# Patient Record
Sex: FEMALE | Race: WHITE | Employment: FULL TIME | URBAN - METROPOLITAN AREA
[De-identification: names, ages, dates, MRNs, and addresses within clinical notes are randomized per-mention and may not be internally consistent; named-entity substitution may affect disease eponyms.]

---

## 2020-12-15 PROBLEM — K51.019 ULCERATIVE PANCOLITIS WITH COMPLICATION (HCC): Status: ACTIVE | Noted: 2020-12-15

## 2020-12-15 PROBLEM — E10.9 DIABETES TYPE 1, CONTROLLED (HCC): Status: ACTIVE | Noted: 2020-12-15

## 2024-08-13 RX ORDER — ESCITALOPRAM OXALATE 20 MG/1
20 TABLET ORAL DAILY
COMMUNITY

## 2024-08-13 RX ORDER — LISINOPRIL 10 MG/1
10 TABLET ORAL DAILY
COMMUNITY

## 2024-08-13 RX ORDER — GABAPENTIN 100 MG/1
100 CAPSULE ORAL 3 TIMES DAILY
COMMUNITY

## 2024-08-17 ENCOUNTER — LAB ENCOUNTER (OUTPATIENT)
Dept: LAB | Age: 34
End: 2024-08-17
Attending: OBSTETRICS & GYNECOLOGY
Payer: COMMERCIAL

## 2024-08-17 DIAGNOSIS — Z01.818 PRE-OP TESTING: ICD-10-CM

## 2024-08-17 LAB
ANION GAP SERPL CALC-SCNC: 0 MMOL/L (ref 0–18)
BASOPHILS # BLD AUTO: 0.09 X10(3) UL (ref 0–0.2)
BASOPHILS NFR BLD AUTO: 1 %
BUN BLD-MCNC: 13 MG/DL (ref 9–23)
CALCIUM BLD-MCNC: 9.5 MG/DL (ref 8.7–10.4)
CHLORIDE SERPL-SCNC: 107 MMOL/L (ref 98–112)
CO2 SERPL-SCNC: 26 MMOL/L (ref 21–32)
CREAT BLD-MCNC: 1.12 MG/DL
EGFRCR SERPLBLD CKD-EPI 2021: 67 ML/MIN/1.73M2 (ref 60–?)
EOSINOPHIL # BLD AUTO: 0.31 X10(3) UL (ref 0–0.7)
EOSINOPHIL NFR BLD AUTO: 3.4 %
ERYTHROCYTE [DISTWIDTH] IN BLOOD BY AUTOMATED COUNT: 12.7 %
FASTING STATUS PATIENT QL REPORTED: NO
GLUCOSE BLD-MCNC: 356 MG/DL (ref 70–99)
HCT VFR BLD AUTO: 36.1 %
HGB BLD-MCNC: 11.8 G/DL
IMM GRANULOCYTES # BLD AUTO: 0.03 X10(3) UL (ref 0–1)
IMM GRANULOCYTES NFR BLD: 0.3 %
LYMPHOCYTES # BLD AUTO: 2.16 X10(3) UL (ref 1–4)
LYMPHOCYTES NFR BLD AUTO: 23.9 %
MCH RBC QN AUTO: 29.4 PG (ref 26–34)
MCHC RBC AUTO-ENTMCNC: 32.7 G/DL (ref 31–37)
MCV RBC AUTO: 90 FL
MONOCYTES # BLD AUTO: 0.51 X10(3) UL (ref 0.1–1)
MONOCYTES NFR BLD AUTO: 5.6 %
NEUTROPHILS # BLD AUTO: 5.94 X10 (3) UL (ref 1.5–7.7)
NEUTROPHILS # BLD AUTO: 5.94 X10(3) UL (ref 1.5–7.7)
NEUTROPHILS NFR BLD AUTO: 65.8 %
OSMOLALITY SERPL CALC.SUM OF ELEC: 290 MOSM/KG (ref 275–295)
PLATELET # BLD AUTO: 250 10(3)UL (ref 150–450)
POTASSIUM SERPL-SCNC: 5.4 MMOL/L (ref 3.5–5.1)
RBC # BLD AUTO: 4.01 X10(6)UL
SODIUM SERPL-SCNC: 133 MMOL/L (ref 136–145)
WBC # BLD AUTO: 9 X10(3) UL (ref 4–11)

## 2024-08-17 PROCEDURE — 85025 COMPLETE CBC W/AUTO DIFF WBC: CPT

## 2024-08-17 PROCEDURE — 80048 BASIC METABOLIC PNL TOTAL CA: CPT

## 2024-08-17 PROCEDURE — 36415 COLL VENOUS BLD VENIPUNCTURE: CPT

## 2024-08-23 ENCOUNTER — HOSPITAL ENCOUNTER (OUTPATIENT)
Dept: CT IMAGING | Age: 34
Discharge: HOME | End: 2024-08-23
Attending: OBSTETRICS & GYNECOLOGY
Payer: COMMERCIAL

## 2024-08-23 DIAGNOSIS — R10.2 PELVIC PAIN SYNDROME: ICD-10-CM

## 2024-08-23 DIAGNOSIS — N80.03 ENDOMETRIOSIS OF MYOMETRIUM: ICD-10-CM

## 2024-08-23 DIAGNOSIS — N80.00 ENDOMETRIOSIS OF UTERUS: ICD-10-CM

## 2024-08-23 PROCEDURE — 72193 CT PELVIS W/DYE: CPT | Performed by: OBSTETRICS & GYNECOLOGY

## 2024-08-30 NOTE — PAT NURSING NOTE
Reviewing labwork drawn 8/17/24; K elevated. Spoke with Hermann (Lab Charge RN) at 0934; stated should recheck AM of procedure to verify not continued high and talk with patient.    RN called pt at note time; discussed elevated level. Patient states she does not eat bananas but does eat spinach d/t iron deficiency anemia. Also reports A1c elevated and Endocrinologist made adjustments to insulin pump on Monday. RN discussed potassium rich foods to minimize over the weekend and pt could call Endocrinologist to discuss lab level and effect pump change might have on K. Will recheck lab on admit for procedure and may have to reschedule if persistent high or further elevated. Pt verbalized understanding of the conversation.

## 2024-09-03 ENCOUNTER — HOSPITAL ENCOUNTER (OUTPATIENT)
Dept: INTERVENTIONAL RADIOLOGY/VASCULAR | Facility: HOSPITAL | Age: 34
Discharge: HOME | End: 2024-09-03
Attending: OBSTETRICS & GYNECOLOGY | Admitting: OBSTETRICS & GYNECOLOGY
Payer: COMMERCIAL

## 2024-09-03 VITALS
HEART RATE: 100 BPM | RESPIRATION RATE: 16 BRPM | OXYGEN SATURATION: 100 % | SYSTOLIC BLOOD PRESSURE: 99 MMHG | DIASTOLIC BLOOD PRESSURE: 69 MMHG | TEMPERATURE: 98 F

## 2024-09-03 DIAGNOSIS — N94.89 PELVIC CONGESTION: ICD-10-CM

## 2024-09-03 DIAGNOSIS — Z01.818 PRE-OP TESTING: Primary | ICD-10-CM

## 2024-09-03 LAB — INR: 1 (ref 0.8–1.3)

## 2024-09-03 PROCEDURE — 36246 INS CATH ABD/L-EXT ART 2ND: CPT | Performed by: RADIOLOGY

## 2024-09-03 PROCEDURE — 75736 ARTERY X-RAYS PELVIS: CPT | Performed by: RADIOLOGY

## 2024-09-03 PROCEDURE — 36252 INS CATH REN ART 1ST BILAT: CPT | Performed by: RADIOLOGY

## 2024-09-03 PROCEDURE — 85610 PROTHROMBIN TIME: CPT | Performed by: RADIOLOGY

## 2024-09-03 PROCEDURE — B4181ZZ FLUOROSCOPY OF BILATERAL RENAL ARTERIES USING LOW OSMOLAR CONTRAST: ICD-10-PCS | Performed by: RADIOLOGY

## 2024-09-03 PROCEDURE — 36245 INS CATH ABD/L-EXT ART 1ST: CPT | Performed by: RADIOLOGY

## 2024-09-03 RX ORDER — SODIUM CHLORIDE 9 MG/ML
INJECTION, SOLUTION INTRAVENOUS CONTINUOUS
Status: DISCONTINUED | OUTPATIENT
Start: 2024-09-03 | End: 2024-09-03

## 2024-09-03 RX ORDER — LIDOCAINE HYDROCHLORIDE 10 MG/ML
INJECTION, SOLUTION INFILTRATION; PERINEURAL
Status: COMPLETED
Start: 2024-09-03 | End: 2024-09-03

## 2024-09-03 RX ORDER — HEPARIN SODIUM 5000 [USP'U]/ML
INJECTION, SOLUTION INTRAVENOUS; SUBCUTANEOUS
Status: COMPLETED
Start: 2024-09-03 | End: 2024-09-03

## 2024-09-03 RX ORDER — DIPHENHYDRAMINE HYDROCHLORIDE 50 MG/ML
INJECTION INTRAMUSCULAR; INTRAVENOUS
Status: COMPLETED
Start: 2024-09-03 | End: 2024-09-03

## 2024-09-03 RX ORDER — MIDAZOLAM HYDROCHLORIDE 1 MG/ML
INJECTION INTRAMUSCULAR; INTRAVENOUS
Status: COMPLETED
Start: 2024-09-03 | End: 2024-09-03

## 2024-09-03 NOTE — H&P
[facility]   [facility]   History & Physical    Shira Thao Patient Status:  Outpatient    1990 MRN [id number]   Location [facility] INTERVENTIONAL SUITES Attending No att. providers found   Hosp Day # 0 PCP FELECIA ESCAMILLA     Admitting Diagnosis:   Pelvic pain    History of Present Illness:   33 yo F constant pelvic pain for several years.  Worse at end of day.  Family with history of varicose veins.  On feet roughly 4 hrs per day but active with young child.  Worse with periods.  ROS otherwise neg.    History   Past Medical History:  Past Medical History:    Anxiety state    Depression    Kidney infection    Pelvic congestion    Type 1 diabetes mellitus (HCC)       Past Surgical History:  Past Surgical History:   Procedure Laterality Date    Lap, surg; colectomy, total, abdom, w/o proctectomy, w/ileostomy/ileoproctostomy Right 2010    Revision of ileostomy,simple  2016    Tracheostomy, planned  2016    only in for 1 month    Unlisted proc, laparoscopy, surgical, endocrine system         Social History:  Social History     Tobacco Use    Smoking status: Never    Smokeless tobacco: Never   Substance Use Topics    Alcohol use: Never        Family History:  History reviewed. No pertinent family history.    Allergies/Medications:   Allergies:  Allergies   Allergen Reactions    Chlorhexidine ITCHING    Adhesive Tape OTHER (SEE COMMENTS)     itching and blotches from tele pad stickers    Gluten Meal OTHER (SEE COMMENTS)    Latex OTHER (SEE COMMENTS)    Nickel OTHER (SEE COMMENTS)     Skin reaction      Morphine ITCHING       Medications:    Current Outpatient Medications:     adalimumab 80 MG/0.8ML Subcutaneous Pen-injector Kit, Inject 160 mg into the skin once. Every 4 weeks, Disp: , Rfl:     lisinopril 10 MG Oral Tab, Take 1 tablet (10 mg total) by mouth daily. For protein in urning, Disp: , Rfl:     escitalopram 20 MG Oral Tab, Take 1 tablet (20 mg total) by  mouth daily., Disp: , Rfl:     gabapentin 100 MG Oral Cap, Take 1 capsule (100 mg total) by mouth 3 (three) times daily., Disp: , Rfl:     insulin aspart 100 UNIT/ML Subcutaneous Solution, USE 80 TO 90 UNITS D VIA PUMP, Disp: , Rfl:     Cholecalciferol (VITAMIN D-3) 25 MCG (1000 UT) Oral Cap, Take 1,000 Units by mouth., Disp: , Rfl:     Metoclopramide HCl 5 MG Oral Tab, Take 1 tablet (5 mg total) by mouth 3 (three) times daily before meals., Disp: , Rfl:     acetaminophen 500 MG Oral Tab, Take 1 tablet (500 mg total) by mouth every 6 (six) hours as needed for Pain., Disp: , Rfl:     Continuous Blood Gluc Sensor (DEXCOM G6 SENSOR) Does not apply Misc, Take 1 Bottle by mouth As Directed., Disp: , Rfl:     Continuous Blood Gluc Transmit (DEXCOM G6 TRANSMITTER) Does not apply Misc, Take 1 Bottle by mouth As Directed., Disp: , Rfl:     CONTOUR NEXT TEST In Vitro Strip, TEST 6 TIMES A DAY AS DIRECTED, Disp: , Rfl:     diphenhydrAMINE HCl 25 MG Oral Tab, Take 25 mg by mouth every 6 (six) hours as needed for Itching., Disp: , Rfl:     Physical Exam & Review of Systems:   Physical Exam:    BP 99/69   Pulse 100   Temp 97.6 °F (36.4 °C) (Temporal)   Resp 16   LMP 08/04/2024 (Exact Date)   SpO2 100%     General: NAD  Neck: No JVD  Lungs: CTA bilat  Heart: RRR, S1, S2  Abdomen: Soft, NT/ND, BS+x4  Extremities: Warm, dry, no LE edema bilat  Pulses: 2+ bilat DP    Results:   Labs:  No results for input(s): \"RBC\", \"HGB\", \"HCT\", \"MCV\", \"MCH\", \"MCHC\", \"RDW\", \"NEPRELIM\", \"WBC\", \"PLT\" in the last 168 hours.  Recent Labs   Lab 09/03/24  0842   INR 1.0     No results for input(s): \"GLU\", \"BUN\", \"CREATSERUM\", \"GFRAA\", \"GFRNAA\", \"CA\", \"NA\", \"K\", \"CL\", \"CO2\" in the last 168 hours.    Assessment/Plan:   Impression: 35 yo F pelvic pain    I have discussed with the patient and/or legal representative the potential benefits, risks, and side effects of this procedure, the likelihood of the patient achieving goals; and the potential problems  that might occur during recuperation.  I discussed reasonable alternatives to the procedure, including risks, benefits and side effects related to the alternatives, and risks related to not receiving this procedure.      Recommendations: Pelvic venogram    Moises Mondragon MD  9/3/2024  1:00 PM

## 2024-09-03 NOTE — PROGRESS NOTES
Rc'd pt from IR s/p pelvic venogram in stable condition. VSS. Manual dressing to right groin is soft, clean and dry. No bleeding or hematoma. Pt denies c/o pain or discomfort. Family at bedside.     12:00: Dr Mondragon at bedside. Pt tolerated po intake well. Bedrest completed. Pt ambulated and tolerated well. Groin stable. Voided. No IV access. Reviewed discharge instructions, verbalizes understanding. Home via w/c in stable condition without c/o. Pts father is the .

## 2024-09-03 NOTE — PROCEDURES
[facility]   part of [facility]  Procedure Note    Shira Thao Patient Status:  Outpatient    1990 MRN [id number]   Location [facility] INTERVENTIONAL SUITES Attending No att. providers found   Hosp Day # 0 PCP FELECIA ESCAMILLA     Procedure: Pelvic venogram    Pre-Procedure Diagnosis:  Pelvic pain    Post-Procedure Diagnosis: Same    Anesthesia:  Local and Sedation    Findings:  No evidence of venous insufficiency.    Specimens: None    Blood Loss:  Minimal    Tourniquet Time: None  Complications:  None  Drains:  None    Secondary Diagnosis:  None    Moises Mondragon MD  9/3/2024

## 2024-09-03 NOTE — DISCHARGE INSTRUCTIONS
HOME CARE INSTRUCTIONS FOLLOWING VENOUS ACCESS PROCEDURES:     Activity  ~ DO NOT drive after the procedure. You may resume driving tomorrow.   ~ Plan on resting and relaxing tonight and tomorrow.   ~ Do not lift anything over 10 pounds for the next 24hrs.   ~ Avoid sexual activity for the next 24hrs.   ~ Avoid drinking alcohol for the next 24hrs.  ~ Resume your normal activity after 24hrs, or as instructed by your physician.    What is Normal?   ~The procedure site may appear bruised or discolored.   ~ The procedure site may be tender to the touch.  ~ There may be a small amount of drainage on the bandage.     Special Instructions  ~The bandage is to remain in place for 24hrs.  ~After 24hrs, you MUST remove the bandage. You should shower after removing the bandage, and wash the procedure site gently with soap and water. Leave the site open to air, you do not need to cover it. No tub baths/pools for 3 days.     When you should NOTIFY YOUR PHYSICIAN  ~ If you have shortness of breath or a persistent cough  ~ If you have chest pains (angina)   ~ If you have palpitations or irregular heart beats  ~ If you have persistent pain at the procedure site  ~ If you experience signs of a fever, temperature greater than 101 degrees, chills, infection (redness, swelling, thick yellow drainage, or a foul odor from the procedure site)     Other  ~ You arielle resume your present diet, unless otherwise specified by your physician  ~ You may resume all of your medications as prescribed, unless otherwise directed by your physician. A list of your medications was provided at discharge  ~Please call your physician's office for a follow up appointment. You shold be seen in 1-2 weeks

## 2025-01-14 ENCOUNTER — HOSPITAL ENCOUNTER (OUTPATIENT)
Dept: CT IMAGING | Facility: HOSPITAL | Age: 35
Discharge: HOME | End: 2025-01-14
Attending: OBSTETRICS & GYNECOLOGY
Payer: COMMERCIAL

## 2025-01-14 ENCOUNTER — IMAGING SERVICES (OUTPATIENT)
Dept: OTHER | Age: 35
End: 2025-01-14

## 2025-01-14 DIAGNOSIS — I87.1 MAY-THURNER SYNDROME: ICD-10-CM

## 2025-01-14 LAB
CREAT BLD-MCNC: 0.8 MG/DL
EGFRCR SERPLBLD CKD-EPI 2021: 100 ML/MIN/1.73M2 (ref 60–?)

## 2025-01-14 PROCEDURE — 74177 CT ABD & PELVIS W/CONTRAST: CPT | Performed by: OBSTETRICS & GYNECOLOGY

## 2025-01-14 PROCEDURE — 82565 ASSAY OF CREATININE: CPT

## 2025-03-31 PROBLEM — Z93.2 ILEOSTOMY, HAS CURRENTLY (HCC): Status: ACTIVE | Noted: 2025-03-31

## 2025-03-31 PROBLEM — E11.43 DM GASTROPARESIS (HCC): Status: ACTIVE | Noted: 2025-03-31

## 2025-03-31 PROBLEM — K31.84 DM GASTROPARESIS (HCC): Status: ACTIVE | Noted: 2025-03-31

## 2025-03-31 PROBLEM — F41.9 ANXIETY DISORDER: Status: ACTIVE | Noted: 2025-03-31

## 2025-03-31 RX ORDER — PANTOPRAZOLE SODIUM 40 MG/1
40 TABLET, DELAYED RELEASE ORAL 2 TIMES DAILY
COMMUNITY

## 2025-03-31 RX ORDER — ONDANSETRON 4 MG/1
4 TABLET, ORALLY DISINTEGRATING ORAL DAILY
COMMUNITY

## 2025-04-05 ENCOUNTER — EKG ENCOUNTER (OUTPATIENT)
Dept: LAB | Age: 35
End: 2025-04-05
Attending: OBSTETRICS & GYNECOLOGY
Payer: COMMERCIAL

## 2025-04-05 DIAGNOSIS — K51.019 ULCERATIVE PANCOLITIS WITH COMPLICATION (HCC): ICD-10-CM

## 2025-04-05 DIAGNOSIS — K31.84 DM GASTROPARESIS (HCC): ICD-10-CM

## 2025-04-05 DIAGNOSIS — E11.43 DM GASTROPARESIS (HCC): ICD-10-CM

## 2025-04-05 LAB
ATRIAL RATE: 93 BPM
P AXIS: 72 DEGREES
P-R INTERVAL: 110 MS
Q-T INTERVAL: 346 MS
QRS DURATION: 62 MS
QTC CALCULATION (BEZET): 430 MS
R AXIS: 67 DEGREES
T AXIS: 63 DEGREES
VENTRICULAR RATE: 93 BPM

## 2025-04-05 PROCEDURE — 93005 ELECTROCARDIOGRAM TRACING: CPT

## 2025-04-05 PROCEDURE — 93010 ELECTROCARDIOGRAM REPORT: CPT | Performed by: INTERNAL MEDICINE

## 2025-04-14 NOTE — H&P
[facility]   part of [facility]    History and Physical    Shira Thao Patient Status:  Hospital Outpatient Surgery    1990 MRN [id number]   Location [facility] SURGERY Attending Ken Salinas, Paul CRAMER MD   Hosp Day # 0 PCP FELECIA ESCAMILLA     Date:  2025  Date of Admission:  (Not on file)    History provided by:patient  HPI:   No chief complaint on file.    HPI pelvic adhesions, endometriosis    History   Past Medical History[1]  Past Surgical History[2]  Family History[3]  Social History:  Short Social Hx on File[4]  Allergies/Medications:   Allergies: Allergies[5]  Prescriptions Prior to Admission[6]    Review of Systems:     Constitutional: Negative.        Physical Exam:   Vital Signs:  Height 5' (1.524 m), weight 103 lb (46.7 kg), last menstrual period 2025, not currently breastfeeding.  Physical Exam  Constitutional:       Appearance: Normal appearance.   Cardiovascular:      Rate and Rhythm: Normal rate and regular rhythm.      Pulses: Normal pulses.      Heart sounds: Normal heart sounds.   Pulmonary:      Effort: Pulmonary effort is normal.      Breath sounds: Normal breath sounds.   Abdominal:      Palpations: Abdomen is soft.   Musculoskeletal:         General: Normal range of motion.   Skin:     General: Skin is warm and dry.   Neurological:      Mental Status: She is alert and oriented to person, place, and time.   Psychiatric:         Behavior: Behavior normal.       Cervical Papanicolaou done within 1 year of adm    Results:     Lab Results   Component Value Date    WBC 9.0 2024    HGB 11.8 (L) 2024    HCT 36.1 2024    .0 2024    CREATSERUM 1.12 (H) 2024    BUN 13 2024     (L) 2024    K 5.4 (H) 2024     2024    CO2 26.0 2024     (H) 2024    CA 9.5 2024    INR 1.0 2024     No results found.        Assessment/Plan:     LAPAROSCOPY, EXCISION OF  ENDOMETRIOSIS, LYSIS OF ADHESIONS, HYSTEROSCOPY, TUBAL LAVAGE, CYSTOSCOPY             Md Paul Ireland MD  4/14/2025         [1]   Past Medical History:   Anxiety state    Crohn disease (HCC)    Depression    Esophageal reflux    History of blood transfusion    2012    Kidney infection    Migraines    Pelvic congestion    PONV (postoperative nausea and vomiting)    Type 1 diabetes mellitus (HCC)    Visual impairment    glasses, corneal ulcers, patch on right eye   [2]   Past Surgical History:  Procedure Laterality Date    Lap, surg; colectomy, total, abdom, w/o proctectomy, w/ileostomy/ileoproctostomy Right 05/2010    Revision of ileostomy,simple  11/2016    Tracheostomy, planned  11/2016    only in for 1 month    Unlisted proc, laparoscopy, surgical, endocrine system  2013   [3] History reviewed. No pertinent family history.  [4]   Social History  Socioeconomic History    Marital status: Single   Tobacco Use    Smoking status: Some Days     Current packs/day: 0.50     Types: Cigarettes    Smokeless tobacco: Never   Vaping Use    Vaping status: Never Used   Substance and Sexual Activity    Alcohol use: Never    Drug use: Never   [5]   Allergies  Allergen Reactions    Adhesive Tape OTHER (SEE COMMENTS)     itching and blotches from tele pad stickers    Gluten Meal OTHER (SEE COMMENTS)    Latex OTHER (SEE COMMENTS)     rash    Nickel OTHER (SEE COMMENTS)     Skin reaction      Morphine ITCHING   [6]   No medications prior to admission.

## 2025-04-16 ENCOUNTER — ANESTHESIA EVENT (OUTPATIENT)
Dept: SURGERY | Facility: HOSPITAL | Age: 35
End: 2025-04-16
Payer: COMMERCIAL

## 2025-04-16 ENCOUNTER — HOSPITAL ENCOUNTER (OUTPATIENT)
Facility: HOSPITAL | Age: 35
Setting detail: HOSPITAL OUTPATIENT SURGERY
Discharge: HOME | End: 2025-04-16
Attending: OBSTETRICS & GYNECOLOGY | Admitting: OBSTETRICS & GYNECOLOGY
Payer: COMMERCIAL

## 2025-04-16 ENCOUNTER — ANESTHESIA (OUTPATIENT)
Dept: SURGERY | Facility: HOSPITAL | Age: 35
End: 2025-04-16
Payer: COMMERCIAL

## 2025-04-16 VITALS
HEIGHT: 60 IN | SYSTOLIC BLOOD PRESSURE: 100 MMHG | HEART RATE: 93 BPM | BODY MASS INDEX: 20.22 KG/M2 | WEIGHT: 103 LBS | DIASTOLIC BLOOD PRESSURE: 59 MMHG | OXYGEN SATURATION: 100 % | RESPIRATION RATE: 17 BRPM | TEMPERATURE: 98 F

## 2025-04-16 DIAGNOSIS — G89.18 POST-OPERATIVE PAIN: ICD-10-CM

## 2025-04-16 DIAGNOSIS — E11.43 DM GASTROPARESIS (HCC): ICD-10-CM

## 2025-04-16 DIAGNOSIS — K51.019 ULCERATIVE PANCOLITIS WITH COMPLICATION (HCC): Primary | ICD-10-CM

## 2025-04-16 DIAGNOSIS — K31.84 DM GASTROPARESIS (HCC): ICD-10-CM

## 2025-04-16 LAB
B-HCG UR QL: NEGATIVE
GLUCOSE BLD-MCNC: 126 MG/DL (ref 70–99)
GLUCOSE BLD-MCNC: 138 MG/DL (ref 70–99)

## 2025-04-16 PROCEDURE — 82962 GLUCOSE BLOOD TEST: CPT

## 2025-04-16 PROCEDURE — 88305 TISSUE EXAM BY PATHOLOGIST: CPT | Performed by: OBSTETRICS & GYNECOLOGY

## 2025-04-16 PROCEDURE — 81025 URINE PREGNANCY TEST: CPT

## 2025-04-16 DEVICE — ABSORBABLE ADHESION BARRIER
Type: IMPLANTABLE DEVICE | Site: ABDOMEN | Status: FUNCTIONAL
Brand: GYNECARE INTERCEED

## 2025-04-16 RX ORDER — MEPERIDINE HYDROCHLORIDE 25 MG/ML
INJECTION INTRAMUSCULAR; INTRAVENOUS; SUBCUTANEOUS
Status: COMPLETED
Start: 2025-04-16 | End: 2025-04-16

## 2025-04-16 RX ORDER — LIDOCAINE HYDROCHLORIDE 10 MG/ML
INJECTION, SOLUTION EPIDURAL; INFILTRATION; INTRACAUDAL; PERINEURAL AS NEEDED
Status: DISCONTINUED | OUTPATIENT
Start: 2025-04-16 | End: 2025-04-16 | Stop reason: SURG

## 2025-04-16 RX ORDER — ACETAMINOPHEN 500 MG
1000 TABLET ORAL ONCE
Status: DISCONTINUED | OUTPATIENT
Start: 2025-04-16 | End: 2025-04-16 | Stop reason: HOSPADM

## 2025-04-16 RX ORDER — NEOSTIGMINE METHYLSULFATE 1 MG/ML
INJECTION INTRAVENOUS AS NEEDED
Status: DISCONTINUED | OUTPATIENT
Start: 2025-04-16 | End: 2025-04-16 | Stop reason: SURG

## 2025-04-16 RX ORDER — MEPERIDINE HYDROCHLORIDE 25 MG/ML
12.5 INJECTION INTRAMUSCULAR; INTRAVENOUS; SUBCUTANEOUS AS NEEDED
Status: DISCONTINUED | OUTPATIENT
Start: 2025-04-16 | End: 2025-04-16

## 2025-04-16 RX ORDER — DEXAMETHASONE SODIUM PHOSPHATE 4 MG/ML
VIAL (ML) INJECTION AS NEEDED
Status: DISCONTINUED | OUTPATIENT
Start: 2025-04-16 | End: 2025-04-16 | Stop reason: SURG

## 2025-04-16 RX ORDER — HYDROMORPHONE HYDROCHLORIDE 1 MG/ML
0.4 INJECTION, SOLUTION INTRAMUSCULAR; INTRAVENOUS; SUBCUTANEOUS EVERY 5 MIN PRN
Status: DISCONTINUED | OUTPATIENT
Start: 2025-04-16 | End: 2025-04-16

## 2025-04-16 RX ORDER — ONDANSETRON 2 MG/ML
4 INJECTION INTRAMUSCULAR; INTRAVENOUS EVERY 6 HOURS PRN
Status: DISCONTINUED | OUTPATIENT
Start: 2025-04-16 | End: 2025-04-16

## 2025-04-16 RX ORDER — HYDROMORPHONE HYDROCHLORIDE 1 MG/ML
INJECTION, SOLUTION INTRAMUSCULAR; INTRAVENOUS; SUBCUTANEOUS
Status: COMPLETED
Start: 2025-04-16 | End: 2025-04-16

## 2025-04-16 RX ORDER — GLYCOPYRROLATE 0.2 MG/ML
INJECTION, SOLUTION INTRAMUSCULAR; INTRAVENOUS AS NEEDED
Status: DISCONTINUED | OUTPATIENT
Start: 2025-04-16 | End: 2025-04-16 | Stop reason: SURG

## 2025-04-16 RX ORDER — INSULIN ASPART 100 [IU]/ML
INJECTION, SOLUTION INTRAVENOUS; SUBCUTANEOUS ONCE
Status: DISCONTINUED | OUTPATIENT
Start: 2025-04-16 | End: 2025-04-16

## 2025-04-16 RX ORDER — NALOXONE HYDROCHLORIDE 0.4 MG/ML
0.08 INJECTION, SOLUTION INTRAMUSCULAR; INTRAVENOUS; SUBCUTANEOUS AS NEEDED
Status: DISCONTINUED | OUTPATIENT
Start: 2025-04-16 | End: 2025-04-16

## 2025-04-16 RX ORDER — HYDROCODONE BITARTRATE AND ACETAMINOPHEN 5; 325 MG/1; MG/1
1 TABLET ORAL EVERY 4 HOURS PRN
Refills: 0 | Status: DISCONTINUED | OUTPATIENT
Start: 2025-04-16 | End: 2025-04-16

## 2025-04-16 RX ORDER — HYDROCODONE BITARTRATE AND ACETAMINOPHEN 5; 325 MG/1; MG/1
2 TABLET ORAL EVERY 4 HOURS PRN
Refills: 0 | Status: DISCONTINUED | OUTPATIENT
Start: 2025-04-16 | End: 2025-04-16

## 2025-04-16 RX ORDER — PROCHLORPERAZINE EDISYLATE 5 MG/ML
5 INJECTION INTRAMUSCULAR; INTRAVENOUS EVERY 8 HOURS PRN
Status: DISCONTINUED | OUTPATIENT
Start: 2025-04-16 | End: 2025-04-16

## 2025-04-16 RX ORDER — MIDAZOLAM HYDROCHLORIDE 1 MG/ML
INJECTION INTRAMUSCULAR; INTRAVENOUS
Status: DISCONTINUED
Start: 2025-04-16 | End: 2025-04-16 | Stop reason: WASHOUT

## 2025-04-16 RX ORDER — HYDROCODONE BITARTRATE AND ACETAMINOPHEN 5; 325 MG/1; MG/1
1-2 TABLET ORAL EVERY 4 HOURS PRN
Qty: 20 TABLET | Refills: 0 | Status: SHIPPED | OUTPATIENT
Start: 2025-04-16

## 2025-04-16 RX ORDER — PHENYLEPHRINE HCL 10 MG/ML
VIAL (ML) INJECTION AS NEEDED
Status: DISCONTINUED | OUTPATIENT
Start: 2025-04-16 | End: 2025-04-16 | Stop reason: SURG

## 2025-04-16 RX ORDER — ROCURONIUM BROMIDE 10 MG/ML
INJECTION, SOLUTION INTRAVENOUS AS NEEDED
Status: DISCONTINUED | OUTPATIENT
Start: 2025-04-16 | End: 2025-04-16 | Stop reason: SURG

## 2025-04-16 RX ORDER — PHENAZOPYRIDINE HYDROCHLORIDE 200 MG/1
200 TABLET, FILM COATED ORAL ONCE
Status: COMPLETED | OUTPATIENT
Start: 2025-04-16 | End: 2025-04-16

## 2025-04-16 RX ORDER — NICOTINE POLACRILEX 4 MG
15 LOZENGE BUCCAL
Status: DISCONTINUED | OUTPATIENT
Start: 2025-04-16 | End: 2025-04-16 | Stop reason: HOSPADM

## 2025-04-16 RX ORDER — SODIUM CHLORIDE, SODIUM LACTATE, POTASSIUM CHLORIDE, CALCIUM CHLORIDE 600; 310; 30; 20 MG/100ML; MG/100ML; MG/100ML; MG/100ML
INJECTION, SOLUTION INTRAVENOUS CONTINUOUS
Status: DISCONTINUED | OUTPATIENT
Start: 2025-04-16 | End: 2025-04-16

## 2025-04-16 RX ORDER — MIDAZOLAM HYDROCHLORIDE 1 MG/ML
1 INJECTION INTRAMUSCULAR; INTRAVENOUS EVERY 5 MIN PRN
Status: DISCONTINUED | OUTPATIENT
Start: 2025-04-16 | End: 2025-04-16

## 2025-04-16 RX ORDER — HYDROMORPHONE HYDROCHLORIDE 1 MG/ML
0.6 INJECTION, SOLUTION INTRAMUSCULAR; INTRAVENOUS; SUBCUTANEOUS EVERY 5 MIN PRN
Status: DISCONTINUED | OUTPATIENT
Start: 2025-04-16 | End: 2025-04-16

## 2025-04-16 RX ORDER — BUPIVACAINE HYDROCHLORIDE 5 MG/ML
INJECTION, SOLUTION EPIDURAL; INTRACAUDAL; PERINEURAL AS NEEDED
Status: DISCONTINUED | OUTPATIENT
Start: 2025-04-16 | End: 2025-04-16 | Stop reason: HOSPADM

## 2025-04-16 RX ORDER — PROCHLORPERAZINE EDISYLATE 5 MG/ML
INJECTION INTRAMUSCULAR; INTRAVENOUS
Status: COMPLETED
Start: 2025-04-16 | End: 2025-04-16

## 2025-04-16 RX ORDER — NICOTINE POLACRILEX 4 MG
30 LOZENGE BUCCAL
Status: DISCONTINUED | OUTPATIENT
Start: 2025-04-16 | End: 2025-04-16 | Stop reason: HOSPADM

## 2025-04-16 RX ORDER — ONDANSETRON 8 MG/1
8 TABLET, ORALLY DISINTEGRATING ORAL EVERY 4 HOURS PRN
Qty: 10 TABLET | Refills: 0 | Status: SHIPPED | OUTPATIENT
Start: 2025-04-16

## 2025-04-16 RX ORDER — ONDANSETRON 2 MG/ML
INJECTION INTRAMUSCULAR; INTRAVENOUS AS NEEDED
Status: DISCONTINUED | OUTPATIENT
Start: 2025-04-16 | End: 2025-04-16 | Stop reason: SURG

## 2025-04-16 RX ORDER — HYDROMORPHONE HYDROCHLORIDE 1 MG/ML
0.2 INJECTION, SOLUTION INTRAMUSCULAR; INTRAVENOUS; SUBCUTANEOUS EVERY 5 MIN PRN
Status: DISCONTINUED | OUTPATIENT
Start: 2025-04-16 | End: 2025-04-16

## 2025-04-16 RX ORDER — DEXTROSE MONOHYDRATE 25 G/50ML
50 INJECTION, SOLUTION INTRAVENOUS
Status: DISCONTINUED | OUTPATIENT
Start: 2025-04-16 | End: 2025-04-16 | Stop reason: HOSPADM

## 2025-04-16 RX ADMIN — PHENYLEPHRINE HCL 100 MCG: 10 MG/ML VIAL (ML) INJECTION at 09:25:00

## 2025-04-16 RX ADMIN — ONDANSETRON 4 MG: 2 INJECTION INTRAMUSCULAR; INTRAVENOUS at 10:21:00

## 2025-04-16 RX ADMIN — SODIUM CHLORIDE, SODIUM LACTATE, POTASSIUM CHLORIDE, CALCIUM CHLORIDE: 600; 310; 30; 20 INJECTION, SOLUTION INTRAVENOUS at 10:48:00

## 2025-04-16 RX ADMIN — PHENYLEPHRINE HCL 100 MCG: 10 MG/ML VIAL (ML) INJECTION at 09:45:00

## 2025-04-16 RX ADMIN — PHENYLEPHRINE HCL 100 MCG: 10 MG/ML VIAL (ML) INJECTION at 09:28:00

## 2025-04-16 RX ADMIN — GLYCOPYRROLATE 0.6 MG: 0.2 INJECTION, SOLUTION INTRAMUSCULAR; INTRAVENOUS at 10:33:00

## 2025-04-16 RX ADMIN — ROCURONIUM BROMIDE 40 MG: 10 INJECTION, SOLUTION INTRAVENOUS at 09:08:00

## 2025-04-16 RX ADMIN — DEXAMETHASONE SODIUM PHOSPHATE 8 MG: 4 MG/ML VIAL (ML) INJECTION at 09:32:00

## 2025-04-16 RX ADMIN — ROCURONIUM BROMIDE 10 MG: 10 INJECTION, SOLUTION INTRAVENOUS at 10:07:00

## 2025-04-16 RX ADMIN — NEOSTIGMINE METHYLSULFATE 3 MG: 1 INJECTION INTRAVENOUS at 10:33:00

## 2025-04-16 RX ADMIN — LIDOCAINE HYDROCHLORIDE 40 MG: 10 INJECTION, SOLUTION EPIDURAL; INFILTRATION; INTRACAUDAL; PERINEURAL at 09:06:00

## 2025-04-16 NOTE — ANESTHESIA POSTPROCEDURE EVALUATION
[facility]    Shira KERR [facility] Patient Status:  Hospital Outpatient Surgery   Age/Gender 35 year old female MRN [id number]   Location [facility] SURGERY Attending Ken Salinas, Paul CRAMER MD   Hosp Day # 0 PCP FELECIA ESCAMILLA       Anesthesia Post-op Note    LAPAROSCOPY, EXCISION OF ENDOMETRIOSIS, LYSIS OF ADHESIONS, HYSTEROSCOPY, TUBAL LAVAGE, CYSTOSCOPY, URTERINE UPLIFT    Procedure Summary       Date: 04/16/25 Room / Location:  MAIN OR 14 / [facility] MAIN OR    Anesthesia Start: 0903 Anesthesia Stop: 1048    Procedure: LAPAROSCOPY, EXCISION OF ENDOMETRIOSIS, LYSIS OF ADHESIONS, HYSTEROSCOPY, TUBAL LAVAGE, CYSTOSCOPY, URTERINE UPLIFT (Abdomen) Diagnosis: (PELVIC PAIN)    Surgeons: Ken Salinas, Paul CRAMER MD Anesthesiologist: Nic Armenta MD    Anesthesia Type: Not recorded ASA Status: 3            Anesthesia Type: No value filed.    Vitals Value Taken Time   /68 04/16/25 10:49   Temp 99.8 °F (37.7 °C) 04/16/25 10:49   Pulse 91 04/16/25 10:50   Resp 9 04/16/25 10:50   SpO2 91 % 04/16/25 10:50   Vitals shown include unfiled device data.        Patient Location: PACU    Anesthesia Type: general    Airway Patency: patent    Postop Pain Control: adequate    Mental Status: mildly sedated but able to meaningfully participate in the post-anesthesia evaluation    Nausea/Vomiting: none    Cardiopulmonary/Hydration status: stable euvolemic    Complications: no apparent anesthesia related complications    Postop vital signs: stable    Dental Exam: Unchanged from Preop    Patient to be discharged from PACU when criteria met.

## 2025-04-16 NOTE — ANESTHESIA PROCEDURE NOTES
Airway  Date/Time: 4/16/2025 9:10 AM  Reason: elective    Airway not difficult    General Information and Staff   Patient location during procedure: OR  Anesthesiologist: Nic Armenta MD  Performed: anesthesiologist   Performed by: Nic Armenta MD  Authorized by: Nic Armenta MD        Indications and Patient Condition  Indications for airway management: anesthesia  Sedation level: deep      Preoxygenated: yesPatient position: sniffing    Mask difficulty assessment: 1 - vent by mask  Planned trial extubation    Final Airway Details    Final airway type: endotracheal airway    Successful airway: ETT  Cuffed: yes   Successful intubation technique: direct laryngoscopy  Endotracheal tube insertion site: oral  Blade: Elissa  Blade size: #3  ETT size (mm): 7.0    Cormack-Lehane Classification: grade IIA - partial view of glottis  Placement verified by: capnometry   Measured from: lips  ETT to lips (cm): 19  Number of attempts at approach: 1

## 2025-04-16 NOTE — BRIEF OP NOTE
Pre-Operative Diagnosis: PELVIC PAIN     Post-Operative Diagnosis: PELVIC PAIN      Procedure Performed:   LAPAROSCOPY, EXCISION OF ENDOMETRIOSIS, LYSIS OF ADHESIONS, HYSTEROSCOPY, TUBAL LAVAGE, CYSTOSCOPY, URTERINE UPLIFT    Surgeons and Role:     * Ken Salinas, Paul CRAMER MD - Primary     * Ira Ferreira MD - Assisting Surgeon    Assistant(s):  RNFA: Angelica Solares RN     Surgical Findings: adhesions endometriosis,      Specimen: endometriosis     Estimated Blood Loss:10cc        Md Paul Ireland MD  4/16/2025  10:10 AM

## 2025-04-16 NOTE — DISCHARGE INSTRUCTIONS
Home Care Instructions  LAPAROSCOPIC SURGERY  Dr CALEB Rogers    1. During the next twenty-four hours, you should neither drive nor operate  complicated machines.  2. During the next few days, you may experience the following   a. Tired, washed out feeling   b. Vaginal bleeding or spotting with cramps, due to the instruments placed in the opening of the womb or the                   cervix, for up to two weeks    c. Mild to moderate shoulder, throat, neck or chest pain or abdominal bloating, due to gas placed in your abdomen during the      procedure (this is temporary and is usually relieved by lying with your back elevated about 30 degrees.   d. Some soreness or slight dark red oozing around the incision is normal   e. Abdominal bruises and tenderness  3. Contact the doctor’s office if   a. You develop a fever of 100.4 degrees or over   b. You have severe pain or persistent vomiting   c. You have a yellowish discharge from the incision or extreme tenderness or   soreness in the area   d. You have extreme vaginal bleeding (saturating greater than one pad per   hour  4. Activity   a. Take you temperature daily for three days   b. Clear liquid diet today and advance to a soft tomorrow as tolerated   c. Leave Tegaderm and steri-strips in place for one week. Sutures dissolve on their own in two to three weeks. If a piece of                 suture is noted it will be removed at the post operative visit.    d. No intercourse for one week   e. No lifting over 25 pounds for three weeks   f. No douching for one week. No tampons for one week   g. No tub bath for three to four days   h. Call office tomorrow to make an appointment for post-operative follow-up in 4-6 weeks   i. You may shower in the morning   j. Take your pain medications as directed      Tovar/Catheter Removal   If you had a Tovar Catheter placed prior to your hospital discharge, remove Tovar Catheter in the early morning, the day after surgery (Post-Op Day 1). If  you are unable to void within 6 hours of removing catheter, contact office immediately.    BEATRICE Trivedi  [phone]

## 2025-04-16 NOTE — INTERVAL H&P NOTE
Pre-op Diagnosis: PELVIC PAIN    The above referenced H&P was reviewed by Md Paul Ireland MD on 4/16/2025, the patient was examined and no significant changes have occurred in the patient's condition since the H&P was performed.  I discussed with the patient and/or legal representative the potential benefits, risks and side effects of this procedure; the likelihood of the patient achieving goals; and potential problems that might occur during recuperation.  I discussed reasonable alternatives to the procedure, including risks, benefits and side effects related to the alternatives and risks related to not receiving this procedure.  We will proceed with procedure as planned.

## 2025-04-16 NOTE — ANESTHESIA PREPROCEDURE EVALUATION
PRE-OP EVALUATION    Patient Name: Shira Thao    Admit Diagnosis: PELVIC PAIN    Pre-op Diagnosis: PELVIC PAIN    LAPAROSCOPY, EXCISION OF ENDOMETRIOSIS, LYSIS OF ADHESIONS, HYSTEROSCOPY, TUBAL LAVAGE, CYSTOSCOPY    Anesthesia Procedure: LAPAROSCOPY, EXCISION OF ENDOMETRIOSIS, LYSIS OF ADHESIONS, HYSTEROSCOPY, TUBAL LAVAGE, CYSTOSCOPY    Surgeons and Role:     * Ken Salinas, Paul CRAMER MD - Primary     * Ira Ferreira MD - Assisting Surgeon    Pre-op vitals reviewed.  Temp: 97.9 °F (36.6 °C)  Pulse: 92  Resp: 16  BP: 116/65  SpO2: 100 %  Body mass index is 20.12 kg/m².    Current medications reviewed.  Hospital Medications:  Current Medications[1]    Outpatient Medications:   Prescriptions Prior to Admission[2]    Allergies: Adhesive tape, Gluten meal, Latex, Nickel, and Morphine      Anesthesia Evaluation    Patient summary reviewed.    Anesthetic Complications  (+) history of anesthetic complications  History of: PONV       GI/Hepatic/Renal      (+) GERD                  (+) Crohn's disease         Cardiovascular                                                       Endo/Other  Comment: Corneal abrasions right eye. Vision affected by ulcers.    (+) diabetes                            Pulmonary                           Neuro/Psych      (+) depression                                Past Surgical History[3]  Social Hx on file[4]  History   Drug Use Unknown     Available pre-op labs reviewed.               Airway      Mallampati: I  Mouth opening: >3 FB  TM distance: > 6 cm  Neck ROM: full Cardiovascular    Cardiovascular exam normal.  Rhythm: regular  Rate: normal     Dental             Pulmonary    Pulmonary exam normal.                 Other findings              ASA: 3     NPO status verified and patient meets guidelines.          Plan/risks discussed with: patient and significant other                Present on Admission:  **None**             [1]    acetaminophen (Tylenol Extra Strength) tab 1,000 mg   1,000 mg Oral Once    glucose (Dex4) 15 GM/59ML oral liquid 15 g  15 g Oral Q15 Min PRN    Or    glucose (Glutose) 40% oral gel 15 g  15 g Oral Q15 Min PRN    Or    glucose-vitamin C (Dex-4) chewable tab 4 tablet  4 tablet Oral Q15 Min PRN    Or    dextrose 50% injection 50 mL  50 mL Intravenous Q15 Min PRN    Or    glucose (Dex4) 15 GM/59ML oral liquid 30 g  30 g Oral Q15 Min PRN    Or    glucose (Glutose) 40% oral gel 30 g  30 g Oral Q15 Min PRN    Or    glucose-vitamin C (Dex-4) chewable tab 8 tablet  8 tablet Oral Q15 Min PRN    lactated ringers infusion   Intravenous Continuous    [COMPLETED] phenazopyridine (Pyridium) tab 200 mg  200 mg Oral Once    ceFAZolin (Ancef) 2g in 10mL IV syringe premix  2 g Intravenous Once   [2]   Medications Prior to Admission   Medication Sig Dispense Refill Last Dose/Taking    ondansetron 4 MG Oral Tablet Dispersible Take 1 tablet (4 mg total) by mouth in the morning.   4/16/2025 at  4:30 AM    pantoprazole 40 MG Oral Tab EC Take 1 tablet (40 mg total) by mouth in the morning and 1 tablet (40 mg total) before bedtime.   4/16/2025 at  4:30 AM    lisinopril 10 MG Oral Tab Take 1 tablet (10 mg total) by mouth in the morning. For protein in urine.   4/15/2025 Evening    escitalopram 20 MG Oral Tab Take 1 tablet (20 mg total) by mouth in the morning.   4/15/2025    gabapentin 100 MG Oral Cap Take 1 capsule (100 mg total) by mouth in the morning and 1 capsule (100 mg total) in the evening and 1 capsule (100 mg total) before bedtime.   4/15/2025    insulin aspart 100 UNIT/ML Subcutaneous Solution USE 80 TO 90 UNITS D VIA PUMP   Taking    Metoclopramide HCl 5 MG Oral Tab Take 1 tablet (5 mg total) by mouth in the morning and 1 tablet (5 mg total) at noon and 1 tablet (5 mg total) in the evening. Take before meals.   4/15/2025    acetaminophen 500 MG Oral Tab Take 1 tablet (500 mg total) by mouth every 6 (six) hours as needed for Pain.   4/16/2025 at  4:30 AM    diphenhydrAMINE HCl 25 MG  Oral Tab Take 1 tablet (25 mg total) by mouth every 6 (six) hours as needed for Itching.   Taking As Needed    Continuous Blood Gluc Sensor (DEXCOM G6 SENSOR) Does not apply Misc Take 1 Bottle by mouth As Directed.       Continuous Blood Gluc Transmit (DEXCOM G6 TRANSMITTER) Does not apply Misc Take 1 Bottle by mouth As Directed.       CONTOUR NEXT TEST In Vitro Strip TEST 6 TIMES A DAY AS DIRECTED       Cholecalciferol (VITAMIN D-3) 25 MCG (1000 UT) Oral Cap Take 1,000 Units by mouth.   More than a month   [3]   Past Surgical History:  Procedure Laterality Date    Lap, surg; colectomy, total, abdom, w/o proctectomy, w/ileostomy/ileoproctostomy Right 05/2010    Revision of ileostomy,simple  11/2016    Tracheostomy, planned  11/2016    only in for 1 month    Unlisted proc, laparoscopy, surgical, endocrine system  2013   [4]   Social History  Socioeconomic History    Marital status: Single   Tobacco Use    Smoking status: Some Days     Current packs/day: 0.50     Types: Cigarettes    Smokeless tobacco: Never   Vaping Use    Vaping status: Never Used   Substance and Sexual Activity    Alcohol use: Never    Drug use: Never

## 2025-04-17 NOTE — OPERATIVE REPORT
[facility]    PATIENT'S NAME: CLAIR BAIG   ATTENDING PHYSICIAN: Paul Rogers M.D.   OPERATING PHYSICIAN: Paul Rogers M.D.   PATIENT ACCOUNT#:   [id number]    LOCATION:  [facility] 14 ED 10  MEDICAL RECORD #:   [id number]       YOB: 1990  ADMISSION DATE:       04/16/2025      OPERATION DATE:  04/16/2025    OPERATIVE REPORT    PREOPERATIVE DIAGNOSIS:    1.   Severe pelvic pain, status post total colectomy, status post proctectomy, status post small bowel obstruction with permanent ileostomy.  2.   Pelvic adhesions.  3.   Left ovarian cyst.  POSTOPERATIVE DIAGNOSIS:    1.   Status post total colectomy, proctectomy, small bowel obstruction with permanent ileostomy.  2.   Pelvic pain.  3.   Pelvic adhesions.  4.   Patent fallopian tubes bilaterally.    5.   Nonvisualization of the left ovary.    6.   Normal bladder.  7.   Stage I endometriosis.  PROCEDURE:  Examination under anesthesia, hysteroscopy, operative laparoscopy, lysis of pelvic adhesions, excision of stage I endometriosis, chromopertubation, cystoscopy, uterine uplift.      ASSISTANT SURGEON:  Ira Ferreira MD.  Note, Dr. Ferreira's role was vital to this case.  We had to perform lysis of adhesions and uterine uplift procedure in a woman who had a total colectomy and proctectomy.  This required a very skilled first assistant.    ANESTHESIA:  Procedure performed under general endotracheal anesthesia.    ESTIMATED BLOOD LOSS:  10 mL.      COMPLICATIONS:  None.    FINDINGS:  On examination under anesthesia, normal-sized uterus.  Uterus sounded to 8 cm.  Hysteroscopy revealed normal uterine cavity.  Both tubal ostia were noted to be normal.  On operative laparoscopy through a left upper quadrant adhesion, there were noted to be adhesions tethering the uterus to the cul-de-sac.  This caused the uterus to be pulled down.  Ultimately, we were able to completely mobilize the uterus.  Interestingly, we could not  visualize the left ovary.  The right ovary appeared totally normal.  On chromopertubation, bilateral fill and spill was noted.  There was an area of endometriosis, anterior pelvis right.  This was completely excised.  Cystoscopy performed at the end of the procedure showed a normal bladder.  Both ureteral orifices normal.       OPERATIVE TECHNIQUE:  The patient was intubated for the purpose of general endotracheal anesthesia, prepped and draped in usual manner for laparoscopy.  Bladder catheterized.  Patient examined under anesthesia with the findings noted above.  Weighted speculum introduced into the vaginal vault.  The anterior lip of the cervix was grasped with a single-tooth tenaculum.  Uterus sounded to 8 cm.  Hysteroscopy now performed.  A Jarcho cannula was placed.    Attention was now directed toward the left upper quadrant.  A small rent was made in midclavicular line.  Through this, a Veress needle was placed.  Insufflation started with carbon dioxide gas.  When the pressure reached 14 mmHg, Veress needle was removed and replaced by laparoscopy trocar and sleeve.  One side trocar was removed and replaced by laparoscope.  Second and third punctures now placed laterally under direct visualization.  Findings were as noted above.  Attention was first directed to the adhesions at the posterior uterus.  These were completely mobilized.  Given the prior surgery, we were very meticulous and very slow moving as we mobilized this area.  Now, we looked for the left ovary but could not see it.  Chromopertubation revealed bilateral fill and spill from the fallopian tubes.  The endometriosis was now noted and excised.  At the end of the procedure, a uterine uplift procedure was performed.  A small rent was made cephalad and lateral to each lateral insertion of round ligament.  We went down the length of the round ligament on a particular size with 0 Prolene suture and then back, forming a fascial bridge.  Thus, when  the suture was tied down, both round ligaments were foreshortened and the uterus rocked forward.    At the end of the procedure, the pelvis was thoroughly irrigated, abdomen suctioned, and incisions closed in the usual fashion.    Attention was now directed toward the cystoscopy.  Cystoscopy was now performed without concern, and normal bladder was noted.  Now, the patient was awakened and taken to the recovery room in satisfactory condition.     Dictated By Paul Rogers M.D.  d: 04/16/2025 10:49:45  t: 04/16/2025 15:57:32  [facility] [id number]  CM/

## (undated) DEVICE — SOLUTION IRRIG 1000ML 0.9% NACL USP BTL

## (undated) DEVICE — GLOVE SUR 7.5 SENSICARE PI PIP CRM PWD F

## (undated) DEVICE — [HIGH FLOW INSUFFLATOR,  DO NOT USE IF PACKAGE IS DAMAGED,  KEEP DRY,  KEEP AWAY FROM SUNLIGHT,  PROTECT FROM HEAT AND RADIOACTIVE SOURCES.]: Brand: PNEUMOSURE

## (undated) DEVICE — 2, DISPOSABLE SUCTION/IRRIGATOR WITHOUT DISPOSABLE TIP: Brand: STRYKEFLOW

## (undated) DEVICE — CURVED JAW CORDLESS ULTRASONIC DISSECTOR: Brand: SONICISION 7

## (undated) DEVICE — KIT,ANTI FOG,W/SPONGE & FLUID,SOFT PACK: Brand: MEDLINE

## (undated) DEVICE — ADHESIVE LIQ 2/3ML VI MASTISOL

## (undated) DEVICE — SUT PROL 0 30IN CT-1 NABSRB BLU L36MM 1/2 CIR

## (undated) DEVICE — MEDI-VAC NON-CONDUCTIVE SUCTION TUBING: Brand: CARDINAL HEALTH

## (undated) DEVICE — ENDOPATH 5MM CURVED SCISSORS WITH MONOPOLAR CAUTERY: Brand: ENDOPATH

## (undated) DEVICE — INSUFFLATION NEEDLE TO ESTABLISH PNEUMOPERITONEUM.: Brand: INSUFFLATION NEEDLE

## (undated) DEVICE — NEEDLE SPNL 18GA L6IN PNK HUB LL QUINKE BVL

## (undated) DEVICE — DECANTER BAG 9": Brand: MEDLINE INDUSTRIES, INC.

## (undated) DEVICE — 40580 - THE PINK PAD - ADVANCED TRENDELENBURG POSITIONING KIT: Brand: 40580 - THE PINK PAD - ADVANCED TRENDELENBURG POSITIONING KIT

## (undated) DEVICE — TROCAR: Brand: KII FIOS FIRST ENTRY

## (undated) DEVICE — SUT MCRYL 4-0 18IN PS-2 ABSRB UD 19MM 3/8 CIR

## (undated) DEVICE — 3M™ TEGADERM™ +PAD FILM DRESSING WITH NON-ADHERENT PAD, 3587, 3-1/2 IN X 4-1/8 IN (9 CM X 10.5 CM), 25/CAR, 4 CAR/CS: Brand: 3M™ TEGADERM™

## (undated) DEVICE — TROCAR: Brand: KII® SLEEVE

## (undated) DEVICE — GYN LAP/ROBOTIC: Brand: MEDLINE INDUSTRIES, INC.

## (undated) DEVICE — LACTATED R 1000ML INJ

## (undated) DEVICE — PLUMEPORT ACTIV LAPAROSCOPIC SMOKE FILTRATION DEVICE: Brand: PLUMEPORT ACTIVE

## (undated) DEVICE — Device: Brand: SUTURE PASSOR PRO

## (undated) DEVICE — 3M™ STERI-STRIP™ REINFORCED ADHESIVE SKIN CLOSURES, R1547, 1/2 IN X 4 IN (12 MM X 100 MM), 6 STRIPS/ENVELOPE: Brand: 3M™ STERI-STRIP™

## (undated) DEVICE — SOLUTION DURAPREP 26ML APPLICATOR

## (undated) DEVICE — LAPAROVUE VISIBILITY SYSTEM LAPAROSCOPIC SOLUTIONS: Brand: LAPAROVUE

## (undated) NOTE — LETTER
OUTSIDE TESTING RESULT REQUEST     IMPORTANT: FOR YOUR IMMEDIATE ATTENTION  Please FAX all test results listed below to: [phone]     Testing already done on or about:      * * * * If testing is NOT complete, arrange with patient A.S.A.P. * * * *      Patient Name: Shira Thao  Surgery Date: 2025  Medical Record: [id number]  CSN: [id number]  : 1990 - A: 35 y     Sex: female  Surgeon(s):  Ken Salinas, MD Jhon Mason Kirsten, MD  Procedure: LAPAROSCOPY, EXCISION OF ENDOMETRIOSIS, LYSIS OF ADHESIONS, HYSTEROSCOPY, TUBAL LAVAGE, CYSTOSCOPY  Anesthesia Type: General     Surgeon: Ken Salinas, Paul CRAMER MD     The following Testing and Time Line are REQUIRED PER ANESTHESIA     EKG READ AND SIGNED WITHIN   90 days      Thank You,   Sent by: [facility]